# Patient Record
Sex: FEMALE | Race: WHITE | URBAN - METROPOLITAN AREA
[De-identification: names, ages, dates, MRNs, and addresses within clinical notes are randomized per-mention and may not be internally consistent; named-entity substitution may affect disease eponyms.]

---

## 2019-06-17 ENCOUNTER — HISTORICAL (OUTPATIENT)
Dept: ADMINISTRATIVE | Facility: HOSPITAL | Age: 24
End: 2019-06-17

## 2019-06-17 ENCOUNTER — HISTORICAL (OUTPATIENT)
Dept: LAB | Facility: HOSPITAL | Age: 24
End: 2019-06-17

## 2022-04-07 ENCOUNTER — HISTORICAL (OUTPATIENT)
Dept: ADMINISTRATIVE | Facility: HOSPITAL | Age: 27
End: 2022-04-07

## 2022-04-23 VITALS
DIASTOLIC BLOOD PRESSURE: 85 MMHG | BODY MASS INDEX: 28.22 KG/M2 | SYSTOLIC BLOOD PRESSURE: 128 MMHG | WEIGHT: 143.75 LBS | HEIGHT: 60 IN | OXYGEN SATURATION: 98 %

## 2025-03-27 ENCOUNTER — TELEPHONE (OUTPATIENT)
Dept: FAMILY MEDICINE | Facility: CLINIC | Age: 30
End: 2025-03-27
Payer: COMMERCIAL

## 2025-03-27 NOTE — TELEPHONE ENCOUNTER
Confirmed appointment with patient. She has not had a PCP in a very long time. No records to request.

## 2025-04-03 ENCOUNTER — OFFICE VISIT (OUTPATIENT)
Dept: FAMILY MEDICINE | Facility: CLINIC | Age: 30
End: 2025-04-03
Payer: COMMERCIAL

## 2025-04-03 VITALS
HEART RATE: 79 BPM | WEIGHT: 147 LBS | HEIGHT: 61 IN | RESPIRATION RATE: 16 BRPM | BODY MASS INDEX: 27.75 KG/M2 | DIASTOLIC BLOOD PRESSURE: 75 MMHG | OXYGEN SATURATION: 98 % | SYSTOLIC BLOOD PRESSURE: 109 MMHG

## 2025-04-03 DIAGNOSIS — Z76.89 ENCOUNTER TO ESTABLISH CARE: Primary | ICD-10-CM

## 2025-04-03 DIAGNOSIS — R22.1 LOCALIZED SWELLING, MASS AND LUMP, NECK: ICD-10-CM

## 2025-04-03 DIAGNOSIS — Z13.1 SCREENING FOR DIABETES MELLITUS: ICD-10-CM

## 2025-04-03 DIAGNOSIS — R01.1 HEART MURMUR: ICD-10-CM

## 2025-04-03 DIAGNOSIS — Z11.4 SCREENING FOR HIV (HUMAN IMMUNODEFICIENCY VIRUS): ICD-10-CM

## 2025-04-03 DIAGNOSIS — Z00.00 ANNUAL PHYSICAL EXAM: ICD-10-CM

## 2025-04-03 DIAGNOSIS — Z11.59 NEED FOR HEPATITIS C SCREENING TEST: ICD-10-CM

## 2025-04-03 DIAGNOSIS — Z80.8 FAMILY HISTORY OF MALIGNANT MELANOMA OF SKIN: ICD-10-CM

## 2025-04-03 RX ORDER — VITAMIN E 268 MG
400 CAPSULE ORAL DAILY
COMMUNITY

## 2025-04-03 RX ORDER — IBUPROFEN 100 MG/5ML
1000 SUSPENSION, ORAL (FINAL DOSE FORM) ORAL DAILY
COMMUNITY

## 2025-04-03 RX ORDER — TRETINOIN 0.5 MG/G
CREAM TOPICAL
COMMUNITY
Start: 2023-07-31

## 2025-04-03 RX ORDER — CRANBERRY FRUIT 450 MG
TABLET ORAL
COMMUNITY

## 2025-04-03 NOTE — PROGRESS NOTES
Subjective:       Patient ID: Rosario Davenport is a 29 y.o. female.    Chief Complaint: Establish Care (Needs a PCP, has been a while since she has seen a doctor.//R side neck/throat swollen x3/4 months. Had the FLU 1 week before [place] and that is when she noticed it. Swelling never went down. No pain now )      History of Present Illness    CHIEF COMPLAINT:  Patient presents today to establish care with concerns about neck swelling    HISTORY OF PRESENT ILLNESS:  She first noticed right sided neck swelling during a flu episode the week before [place], accompanied by severe sore throat. The swelling has persisted with fluctuating size and is non-tender to palpation. She experiences occasional random sore throats. During the initial flu episode, she had pain when looking straight ahead and swallowing, which improved with head tilting. She denies any history of neck swelling prior to this incident.    MEDICAL HISTORY:  She denies any significant medical problems. She receives annual dermatologic care from Dr. Clifford for acne management.    FAMILY HISTORY:  Father has thyroid issues managed with supplements. Mother has history of melanoma. Sister had HPV-positive squamous cell carcinoma of the posterior tongue in the head and neck region. Maternal grandfather had Hodgkin's lymphoma attributed to Agent Orange exposure.        Review of Systems  Comprehensive review of systems negative except as stated in HPI    The patient's Health Maintenance was reviewed and the following appears to be due:   Health Maintenance Due   Topic Date Due    Hepatitis C Screening  Never done    Lipid Panel  Never done    HIV Screening  Never done    Cervical Cancer Screening  04/29/2023       Past Medical History:  History reviewed. No pertinent past medical history.  History reviewed. No pertinent surgical history.  Review of patient's allergies indicates:  No Known Allergies  Medications Ordered Prior to Encounter[1]  Social  "History[2]  Family History   Problem Relation Name Age of Onset    Melanoma Mother Alicia Davenport         skin cancer    Cancer Sister Lisa Davenport         hpv squamous cell carcinoma    Hodgkin's lymphoma Maternal Grandfather Davis Bagley     Diabetes Paternal Grandmother Victoria Davenport        Objective:       /75 (BP Location: Left arm)   Pulse 79   Resp 16   Ht 5' 1" (1.549 m)   Wt 66.7 kg (147 lb)   LMP 03/16/2025   SpO2 98%   BMI 27.78 kg/m²      Physical Exam  Vitals and nursing note reviewed.   Constitutional:       Appearance: Normal appearance.   HENT:      Head: Normocephalic and atraumatic.      Right Ear: Tympanic membrane, ear canal and external ear normal.      Left Ear: Tympanic membrane, ear canal and external ear normal.      Nose: Nose normal.      Mouth/Throat:      Mouth: Mucous membranes are moist.      Pharynx: Oropharynx is clear.   Eyes:      Extraocular Movements: Extraocular movements intact.      Conjunctiva/sclera: Conjunctivae normal.      Pupils: Pupils are equal, round, and reactive to light.   Neck:      Thyroid: Thyromegaly present.        Comments: Right-sided thyromegaly  Cardiovascular:      Rate and Rhythm: Normal rate and regular rhythm.      Heart sounds: Murmur heard.      Systolic murmur is present with a grade of 1/6.   Pulmonary:      Effort: Pulmonary effort is normal.      Breath sounds: Normal breath sounds.   Musculoskeletal:         General: Normal range of motion.      Cervical back: Normal range of motion and neck supple.   Skin:     General: Skin is warm and dry.   Neurological:      General: No focal deficit present.      Mental Status: She is alert and oriented to person, place, and time.   Psychiatric:         Mood and Affect: Mood normal.         Behavior: Behavior normal.         Thought Content: Thought content normal.         Judgment: Judgment normal.             Assessment and Plan     Assessment & Plan    Z76.89 Encounter to establish " care  R22.1 Localized swelling, mass and lump, neck  Z00.00 Annual physical exam  Z11.59 Need for hepatitis C screening test  Z11.4 Screening for HIV (human immunodeficiency virus)  Z13.1 Screening for diabetes mellitus    IMPRESSION:  - Mild heart murmur, not currently concerning but to be monitored annually.    TO ESTABLISH CARE:  - Follow up during Easter break (April 14-21) to review US and lab results.    SWELLING, MASS AND LUMP, NECK:  - Ordered thyroid ultrasound to investigate neck swelling, with plan to proceed to CT Neck if US is unremarkable.  - Schedule US within the next week, preferably early morning and fasting.    PHYSICAL EXAM:  - Ordered wellness labs (fasting).  - Schedule labs within the next week, preferably early morning and fasting.  - Consider scheduling pap smear with Dr. Serrano or alternative OB/GYN if unable to get appointment.  - Family history of melanoma (mother) and squamous cell carcinoma of head and neck (sister), increasing risk for skin cancer.  - Patient to remind Dr. Clifford of family history of melanoma and request full body skin check.  - Recommend full body skin check with dermatologist due to family history of skin cancer.           1. Encounter to establish care    2. Localized swelling, mass and lump, neck  -     US Soft Tissue Head Neck; Future; Expected date: 04/03/2025    3. Annual physical exam  -     CBC Auto Differential; Future; Expected date: 04/03/2025  -     Comprehensive Metabolic Panel; Future; Expected date: 04/03/2025  -     Lipid Panel; Future; Expected date: 04/03/2025  -     TSH; Future; Expected date: 04/03/2025  -     Hemoglobin A1C; Future; Expected date: 04/03/2025    4. Need for hepatitis C screening test  -     Hepatitis C Antibody; Future; Expected date: 04/03/2025    5. Screening for HIV (human immunodeficiency virus)  -     HIV 1/2 Ag/Ab (4th Gen); Future; Expected date: 04/03/2025    6. Screening for diabetes mellitus  -     Hemoglobin A1C; Future;  Expected date: 2025    7. Family history of malignant melanoma of skin  Overview:  Mother  Dr Clifford    Assessment & Plan:  Only seeing Dr. Clifford for her acne, strongly encouraged to let her know that her mother has a history of malignant melanoma and is a patient of hers.  Encouraged to get full-body skin check.             Follow up in about 2 weeks (around 2025) for Annual.     This note was generated with the assistance of ambient listening technology. Verbal consent was obtained by the patient and accompanying visitor(s) for the recording of patient appointment to facilitate this note. I attest to having reviewed and edited the generated note for accuracy, though some syntax or spelling errors may persist. Please contact the author of this note for any clarification.            [1]   Current Outpatient Medications on File Prior to Visit   Medication Sig Dispense Refill    ascorbic acid, vitamin C, (VITAMIN C) 1000 MG tablet Take 1,000 mg by mouth once daily.      cranberry fruit (CRANBERRY) 450 mg Tab Take by mouth.      multivitamin with minerals tablet Take 1 tablet by mouth once daily.      tretinoin (RETIN-A) 0.05 % cream       UNABLE TO FIND medication name: TUMERIC      vitamin E 400 UNIT capsule Take 400 Units by mouth once daily.       No current facility-administered medications on file prior to visit.   [2]   Social History  Socioeconomic History    Marital status: Single   Tobacco Use    Smoking status: Former     Current packs/day: 0.00     Average packs/day: 0.3 packs/day for 2.0 years (0.5 ttl pk-yrs)     Types: Cigarettes     Start date: 2014     Quit date: 2016     Years since quittin.2    Smokeless tobacco: Never   Substance and Sexual Activity    Alcohol use: Not Currently    Drug use: Never    Sexual activity: Not Currently     Partners: Male     Birth control/protection: Abstinence     Social Drivers of Health     Financial Resource Strain: Low Risk  (3/31/2025)     Overall Financial Resource Strain (CARDIA)     Difficulty of Paying Living Expenses: Not very hard   Food Insecurity: No Food Insecurity (3/31/2025)    Hunger Vital Sign     Worried About Running Out of Food in the Last Year: Never true     Ran Out of Food in the Last Year: Never true   Transportation Needs: No Transportation Needs (3/31/2025)    PRAPARE - Transportation     Lack of Transportation (Medical): No     Lack of Transportation (Non-Medical): No   Physical Activity: Sufficiently Active (3/31/2025)    Exercise Vital Sign     Days of Exercise per Week: 3 days     Minutes of Exercise per Session: 60 min   Stress: No Stress Concern Present (3/31/2025)    [employer] - Occupational Stress Questionnaire     Feeling of Stress : Only a little   Housing Stability: Low Risk  (3/31/2025)    Housing Stability Vital Sign     Unable to Pay for Housing in the Last Year: No     Number of Times Moved in the Last Year: 0     Homeless in the Last Year: No

## 2025-04-03 NOTE — ASSESSMENT & PLAN NOTE
Only seeing Dr. Clifford for her acne, strongly encouraged to let her know that her mother has a history of malignant melanoma and is a patient of hers.  Encouraged to get full-body skin check.

## 2025-04-10 ENCOUNTER — PATIENT MESSAGE (OUTPATIENT)
Dept: FAMILY MEDICINE | Facility: CLINIC | Age: 30
End: 2025-04-10
Payer: COMMERCIAL

## 2025-04-14 ENCOUNTER — RESULTS FOLLOW-UP (OUTPATIENT)
Dept: FAMILY MEDICINE | Facility: CLINIC | Age: 30
End: 2025-04-14

## 2025-04-14 ENCOUNTER — LAB VISIT (OUTPATIENT)
Dept: LAB | Facility: HOSPITAL | Age: 30
End: 2025-04-14
Attending: NURSE PRACTITIONER
Payer: COMMERCIAL

## 2025-04-14 DIAGNOSIS — Z13.1 SCREENING FOR DIABETES MELLITUS: ICD-10-CM

## 2025-04-14 DIAGNOSIS — Z11.59 NEED FOR HEPATITIS C SCREENING TEST: ICD-10-CM

## 2025-04-14 DIAGNOSIS — Z11.4 SCREENING FOR HIV (HUMAN IMMUNODEFICIENCY VIRUS): ICD-10-CM

## 2025-04-14 DIAGNOSIS — Z00.00 ANNUAL PHYSICAL EXAM: ICD-10-CM

## 2025-04-14 LAB
ALBUMIN SERPL-MCNC: 4.1 G/DL (ref 3.5–5)
ALBUMIN/GLOB SERPL: 1.2 RATIO (ref 1.1–2)
ALP SERPL-CCNC: 65 UNIT/L (ref 40–150)
ALT SERPL-CCNC: 14 UNIT/L (ref 0–55)
ANION GAP SERPL CALC-SCNC: 9 MEQ/L
AST SERPL-CCNC: 20 UNIT/L (ref 11–45)
BASOPHILS # BLD AUTO: 0.03 X10(3)/MCL
BASOPHILS NFR BLD AUTO: 0.5 %
BILIRUB SERPL-MCNC: 0.5 MG/DL
BUN SERPL-MCNC: 10.5 MG/DL (ref 7–18.7)
CALCIUM SERPL-MCNC: 9.5 MG/DL (ref 8.4–10.2)
CHLORIDE SERPL-SCNC: 108 MMOL/L (ref 98–107)
CO2 SERPL-SCNC: 23 MMOL/L (ref 22–29)
CREAT SERPL-MCNC: 0.66 MG/DL (ref 0.55–1.02)
CREAT/UREA NIT SERPL: 16
EOSINOPHIL # BLD AUTO: 0.06 X10(3)/MCL (ref 0–0.9)
EOSINOPHIL NFR BLD AUTO: 1 %
ERYTHROCYTE [DISTWIDTH] IN BLOOD BY AUTOMATED COUNT: 12.9 % (ref 11.5–17)
EST. AVERAGE GLUCOSE BLD GHB EST-MCNC: 108.3 MG/DL
GFR SERPLBLD CREATININE-BSD FMLA CKD-EPI: >60 ML/MIN/1.73/M2
GLOBULIN SER-MCNC: 3.3 GM/DL (ref 2.4–3.5)
GLUCOSE SERPL-MCNC: 88 MG/DL (ref 74–100)
HBA1C MFR BLD: 5.4 %
HCT VFR BLD AUTO: 38 % (ref 37–47)
HGB BLD-MCNC: 12.5 G/DL (ref 12–16)
IMM GRANULOCYTES # BLD AUTO: 0.01 X10(3)/MCL (ref 0–0.04)
IMM GRANULOCYTES NFR BLD AUTO: 0.2 %
LYMPHOCYTES # BLD AUTO: 2.46 X10(3)/MCL (ref 0.6–4.6)
LYMPHOCYTES NFR BLD AUTO: 42.5 %
MCH RBC QN AUTO: 29 PG (ref 27–31)
MCHC RBC AUTO-ENTMCNC: 32.9 G/DL (ref 33–36)
MCV RBC AUTO: 88.2 FL (ref 80–94)
MONOCYTES # BLD AUTO: 0.41 X10(3)/MCL (ref 0.1–1.3)
MONOCYTES NFR BLD AUTO: 7.1 %
NEUTROPHILS # BLD AUTO: 2.82 X10(3)/MCL (ref 2.1–9.2)
NEUTROPHILS NFR BLD AUTO: 48.7 %
NRBC BLD AUTO-RTO: 0 %
PLATELET # BLD AUTO: 224 X10(3)/MCL (ref 130–400)
PMV BLD AUTO: 11.7 FL (ref 7.4–10.4)
POTASSIUM SERPL-SCNC: 4.1 MMOL/L (ref 3.5–5.1)
PROT SERPL-MCNC: 7.4 GM/DL (ref 6.4–8.3)
RBC # BLD AUTO: 4.31 X10(6)/MCL (ref 4.2–5.4)
SODIUM SERPL-SCNC: 140 MMOL/L (ref 136–145)
TSH SERPL-ACNC: 2.25 UIU/ML (ref 0.35–4.94)
WBC # BLD AUTO: 5.79 X10(3)/MCL (ref 4.5–11.5)

## 2025-04-14 PROCEDURE — 84443 ASSAY THYROID STIM HORMONE: CPT

## 2025-04-14 PROCEDURE — 85025 COMPLETE CBC W/AUTO DIFF WBC: CPT

## 2025-04-14 PROCEDURE — 87389 HIV-1 AG W/HIV-1&-2 AB AG IA: CPT

## 2025-04-14 PROCEDURE — 36415 COLL VENOUS BLD VENIPUNCTURE: CPT

## 2025-04-14 PROCEDURE — 80053 COMPREHEN METABOLIC PANEL: CPT

## 2025-04-14 PROCEDURE — 86803 HEPATITIS C AB TEST: CPT

## 2025-04-14 PROCEDURE — 83036 HEMOGLOBIN GLYCOSYLATED A1C: CPT

## 2025-04-14 NOTE — PROGRESS NOTES
Thyroid ultrasound reviewed, thyroid nodule noted, will discuss in detail at her upcoming appointment.  No suspicious masses noted

## 2025-04-15 LAB
HCV AB SERPL QL IA: NONREACTIVE
HIV 1+2 AB+HIV1 P24 AG SERPL QL IA: NONREACTIVE

## 2025-04-17 ENCOUNTER — OFFICE VISIT (OUTPATIENT)
Dept: FAMILY MEDICINE | Facility: CLINIC | Age: 30
End: 2025-04-17
Payer: COMMERCIAL

## 2025-04-17 VITALS
RESPIRATION RATE: 16 BRPM | BODY MASS INDEX: 27.94 KG/M2 | HEART RATE: 69 BPM | WEIGHT: 148 LBS | OXYGEN SATURATION: 98 % | DIASTOLIC BLOOD PRESSURE: 62 MMHG | SYSTOLIC BLOOD PRESSURE: 106 MMHG | HEIGHT: 61 IN

## 2025-04-17 DIAGNOSIS — Z00.00 ANNUAL PHYSICAL EXAM: Primary | ICD-10-CM

## 2025-04-17 DIAGNOSIS — E04.1 THYROID NODULE: ICD-10-CM

## 2025-04-17 PROBLEM — Z80.9 FAMILY HISTORY OF SQUAMOUS CELL CARCINOMA: Status: ACTIVE | Noted: 2025-04-17

## 2025-04-17 PROCEDURE — 3008F BODY MASS INDEX DOCD: CPT | Mod: CPTII,,, | Performed by: NURSE PRACTITIONER

## 2025-04-17 PROCEDURE — 99395 PREV VISIT EST AGE 18-39: CPT | Mod: ,,, | Performed by: NURSE PRACTITIONER

## 2025-04-17 PROCEDURE — 1160F RVW MEDS BY RX/DR IN RCRD: CPT | Mod: CPTII,,, | Performed by: NURSE PRACTITIONER

## 2025-04-17 PROCEDURE — 3078F DIAST BP <80 MM HG: CPT | Mod: CPTII,,, | Performed by: NURSE PRACTITIONER

## 2025-04-17 PROCEDURE — 3044F HG A1C LEVEL LT 7.0%: CPT | Mod: CPTII,,, | Performed by: NURSE PRACTITIONER

## 2025-04-17 PROCEDURE — 3074F SYST BP LT 130 MM HG: CPT | Mod: CPTII,,, | Performed by: NURSE PRACTITIONER

## 2025-04-17 PROCEDURE — 1159F MED LIST DOCD IN RCRD: CPT | Mod: CPTII,,, | Performed by: NURSE PRACTITIONER

## 2025-04-17 NOTE — ASSESSMENT & PLAN NOTE
- Explained thyroid nodule, while large and palpable, has reassuring characteristics on ultrasound.  - Discussed biopsy is safest option for definitive diagnosis of palpable thyroid nodules.  - Clarified sister's cancer (squamous cell carcinoma of the oropharynx) is different from potential thyroid nodule concerns.  - Referred to Dr. Adame for thyroid nodule biopsy.  - Await call from Dr. Adame's office to schedule thyroid biopsy appointment.

## 2025-04-17 NOTE — ASSESSMENT & PLAN NOTE
- Patient to fast before next blood draw for lipid panel, except for coffee which is allowed.  - Ordered fasting lipid panel.  - Follow up on Friday morning at 8:30 AM for fasting lipid panel.  - Follow up in 1 year for annual appointment.

## 2025-04-17 NOTE — PROGRESS NOTES
"Subjective:       Patient ID: Rosario Davenport is a 29 y.o. female.    Chief Complaint: Annual Exam      History of Present Illness    CHIEF COMPLAINT:  Patient presents today for wellness visit and to review lab results and ultrasound    FAMILY HISTORY:  She reports maternal grandfather and maternal great-grandmother had heart murmurs. Her sister had squamous cell carcinoma of the oropharynx.  Her mom had skin cancer.    LABS:  TSH was 2.255. Chemistries were normal. Hemoglobin A1C was 5.4, indicating no diabetes. Blood counts were normal. Hepatitis C and HIV tests were negative.     IMAGING:  Thyroid ultrasound showed large simple colloid cyst of the right lobe       Review of Systems  Comprehensive review of systems negative except as stated in HPI    The patient's Health Maintenance was reviewed and the following appears to be due:   Health Maintenance Due   Topic Date Due    Lipid Panel  Never done    Cervical Cancer Screening  05/16/2025       Past Medical History:  History reviewed. No pertinent past medical history.  History reviewed. No pertinent surgical history.  Review of patient's allergies indicates:  No Known Allergies  Medications Ordered Prior to Encounter[1]  Social History[2]  Family History   Problem Relation Name Age of Onset    Melanoma Mother Alicia Davenport         skin cancer    Cancer Sister Lisa Davenport         hpv squamous cell carcinoma    Hodgkin's lymphoma Maternal Grandfather Davis Bagley     Heart murmur Maternal Grandfather Davis Bagley     Diabetes Paternal Grandmother Victoria Davenport     Heart murmur Maternal Great-Grandmother         Objective:       /62 (BP Location: Left arm)   Pulse 69   Resp 16   Ht 5' 1" (1.549 m)   Wt 67.1 kg (148 lb)   LMP 03/16/2025   SpO2 98%   BMI 27.96 kg/m²      Physical Exam  Vitals and nursing note reviewed.   Constitutional:       Appearance: Normal appearance.   HENT:      Head: Normocephalic and atraumatic.      Right Ear: " Tympanic membrane, ear canal and external ear normal.      Left Ear: Tympanic membrane, ear canal and external ear normal.      Nose: Nose normal.      Mouth/Throat:      Mouth: Mucous membranes are moist.      Pharynx: Oropharynx is clear.   Eyes:      Extraocular Movements: Extraocular movements intact.      Conjunctiva/sclera: Conjunctivae normal.      Pupils: Pupils are equal, round, and reactive to light.   Neck:      Thyroid: Thyromegaly present.        Comments: Right-sided thyromegaly.  Palpable right thyroid nodule.  Cardiovascular:      Rate and Rhythm: Normal rate and regular rhythm.      Heart sounds: Murmur heard.      Systolic murmur is present with a grade of 1/6.   Pulmonary:      Effort: Pulmonary effort is normal.      Breath sounds: Normal breath sounds.   Musculoskeletal:         General: Normal range of motion.      Cervical back: Normal range of motion and neck supple.   Skin:     General: Skin is warm and dry.   Neurological:      General: No focal deficit present.      Mental Status: She is alert and oriented to person, place, and time.   Psychiatric:         Mood and Affect: Mood normal.         Behavior: Behavior normal.         Thought Content: Thought content normal.         Judgment: Judgment normal.         Labs  Lab Visit on 04/14/2025   Component Date Value Ref Range Status    Sodium 04/14/2025 140  136 - 145 mmol/L Final    Potassium 04/14/2025 4.1  3.5 - 5.1 mmol/L Final    Chloride 04/14/2025 108 (H)  98 - 107 mmol/L Final    CO2 04/14/2025 23  22 - 29 mmol/L Final    Glucose 04/14/2025 88  74 - 100 mg/dL Final    Blood Urea Nitrogen 04/14/2025 10.5  7.0 - 18.7 mg/dL Final    Creatinine 04/14/2025 0.66  0.55 - 1.02 mg/dL Final    Calcium 04/14/2025 9.5  8.4 - 10.2 mg/dL Final    Protein Total 04/14/2025 7.4  6.4 - 8.3 gm/dL Final    Albumin 04/14/2025 4.1  3.5 - 5.0 g/dL Final    Globulin 04/14/2025 3.3  2.4 - 3.5 gm/dL Final    Albumin/Globulin Ratio 04/14/2025 1.2  1.1 - 2.0  ratio Final    Bilirubin Total 04/14/2025 0.5  <=1.5 mg/dL Final    ALP 04/14/2025 65  40 - 150 unit/L Final    ALT 04/14/2025 14  0 - 55 unit/L Final    AST 04/14/2025 20  11 - 45 unit/L Final    eGFR 04/14/2025 >60  mL/min/1.73/m2 Final    Estimated GFR calculated using the CKD-EPI creatinine (2021) equation.    Anion Gap 04/14/2025 9.0  mEq/L Final    BUN/Creatinine Ratio 04/14/2025 16   Final    TSH 04/14/2025 2.255  0.350 - 4.940 uIU/mL Final    Hemoglobin A1c 04/14/2025 5.4  <=7.0 % Final    Estimated Average Glucose 04/14/2025 108.3  mg/dL Final    Hep C Ab Interp 04/14/2025 Nonreactive  Nonreactive Final    HIV 04/14/2025 Nonreactive  Nonreactive Final    WBC 04/14/2025 5.79  4.50 - 11.50 x10(3)/mcL Final    RBC 04/14/2025 4.31  4.20 - 5.40 x10(6)/mcL Final    Hgb 04/14/2025 12.5  12.0 - 16.0 g/dL Final    Hct 04/14/2025 38.0  37.0 - 47.0 % Final    MCV 04/14/2025 88.2  80.0 - 94.0 fL Final    MCH 04/14/2025 29.0  27.0 - 31.0 pg Final    MCHC 04/14/2025 32.9 (L)  33.0 - 36.0 g/dL Final    RDW 04/14/2025 12.9  11.5 - 17.0 % Final    Platelet 04/14/2025 224  130 - 400 x10(3)/mcL Final    MPV 04/14/2025 11.7 (H)  7.4 - 10.4 fL Final    Neut % 04/14/2025 48.7  % Final    Lymph % 04/14/2025 42.5  % Final    Mono % 04/14/2025 7.1  % Final    Eos % 04/14/2025 1.0  % Final    Basophil % 04/14/2025 0.5  % Final    Imm Grans % 04/14/2025 0.2  % Final    Neut # 04/14/2025 2.82  2.1 - 9.2 x10(3)/mcL Final    Lymph # 04/14/2025 2.46  0.6 - 4.6 x10(3)/mcL Final    Mono # 04/14/2025 0.41  0.1 - 1.3 x10(3)/mcL Final    Eos # 04/14/2025 0.06  0 - 0.9 x10(3)/mcL Final    Baso # 04/14/2025 0.03  <=0.2 x10(3)/mcL Final    Imm Gran # 04/14/2025 0.01  0.00 - 0.04 x10(3)/mcL Final    NRBC% 04/14/2025 0.0  % Final       Assessment and Plan     Assessment & Plan    Z00.00 Annual physical exam  E04.1 Thyroid nodule    IMPRESSION:  - Thyroid US revealed large simple colloid nodule with reassuring characteristics.  - Biopsy recommended  due to palpable nature of nodule, despite reassuring US findings.  - Lab results overall normal, including negative hepatitis C and HIV tests, normal TSH (2.255), normal chemistries, and normal HbA1c (5.4%).  - Mild heart murmur noted, likely benign given age and lack of symptoms, and has probably been present for a long time without causing issues.    PHYSICAL EXAM:  - Patient to fast before next blood draw for lipid panel, except for coffee which is allowed.  - Ordered fasting lipid panel.  - Follow up on Friday morning at 8:30 AM for fasting lipid panel.  - Follow up in 1 year for annual appointment.    NODULE:  - Explained thyroid nodule, while large and palpable, has reassuring characteristics on ultrasound.  - Discussed biopsy is safest option for definitive diagnosis of palpable thyroid nodules.  - Clarified sister's cancer (squamous cell carcinoma of the oropharynx) is different from potential thyroid nodule concerns.  - Referred to Dr. Adame for thyroid nodule biopsy.  - Await call from Dr. Adame's office to schedule thyroid biopsy appointment.           1. Annual physical exam  Overview:  Annual wellness exam yearly in April    Assessment & Plan:  - Patient to fast before next blood draw for lipid panel, except for coffee which is allowed.  - Ordered fasting lipid panel.  - Follow up on Friday morning at 8:30 AM for fasting lipid panel.  - Follow up in 1 year for annual appointment.      2. Thyroid nodule  Overview:  US thyroid 04/14/2025 - Mild expansion of the right thyroid lobe due to a large simple colloid cyst at the right mid to lower thyroid measuring 2 cm in greatest dimension. Single small benign-appearing solid nodule at the left lower lobe measuring 4 mm in greatest dimension.    04/17/2025 - refer to Dr Adame    Assessment & Plan:  - Explained thyroid nodule, while large and palpable, has reassuring characteristics on ultrasound.  - Discussed biopsy is safest option for definitive diagnosis  of palpable thyroid nodules.  - Clarified sister's cancer (squamous cell carcinoma of the oropharynx) is different from potential thyroid nodule concerns.  - Referred to Dr. Adame for thyroid nodule biopsy.  - Await call from Dr. Adame's office to schedule thyroid biopsy appointment.     Orders:  -     Ambulatory referral/consult to ENT; Future; Expected date: 2025           Follow up in about 1 year (around 2026) for Annual.     This note was generated with the assistance of ambient listening technology. Verbal consent was obtained by the patient and accompanying visitor(s) for the recording of patient appointment to facilitate this note. I attest to having reviewed and edited the generated note for accuracy, though some syntax or spelling errors may persist. Please contact the author of this note for any clarification.            [1]   Current Outpatient Medications on File Prior to Visit   Medication Sig Dispense Refill    ascorbic acid, vitamin C, (VITAMIN C) 1000 MG tablet Take 1,000 mg by mouth once daily.      cranberry fruit (CRANBERRY) 450 mg Tab Take by mouth.      multivitamin with minerals tablet Take 1 tablet by mouth once daily.      tretinoin (RETIN-A) 0.05 % cream       UNABLE TO FIND medication name: TUMERIC      vitamin E 400 UNIT capsule Take 400 Units by mouth once daily.       No current facility-administered medications on file prior to visit.   [2]   Social History  Socioeconomic History    Marital status: Single   Tobacco Use    Smoking status: Former     Current packs/day: 0.00     Average packs/day: 0.3 packs/day for 2.0 years (0.5 ttl pk-yrs)     Types: Cigarettes     Start date: 2014     Quit date: 2016     Years since quittin.2    Smokeless tobacco: Never   Substance and Sexual Activity    Alcohol use: Not Currently    Drug use: Never    Sexual activity: Not Currently     Partners: Male     Birth control/protection: Abstinence     Social Drivers of Health      Financial Resource Strain: Low Risk  (3/31/2025)    Overall Financial Resource Strain (CARDIA)     Difficulty of Paying Living Expenses: Not very hard   Food Insecurity: No Food Insecurity (3/31/2025)    Hunger Vital Sign     Worried About Running Out of Food in the Last Year: Never true     Ran Out of Food in the Last Year: Never true   Transportation Needs: No Transportation Needs (3/31/2025)    PRAPARE - Transportation     Lack of Transportation (Medical): No     Lack of Transportation (Non-Medical): No   Physical Activity: Sufficiently Active (3/31/2025)    Exercise Vital Sign     Days of Exercise per Week: 3 days     Minutes of Exercise per Session: 60 min   Stress: No Stress Concern Present (3/31/2025)    [employer] - Occupational Stress Questionnaire     Feeling of Stress : Only a little   Housing Stability: Low Risk  (3/31/2025)    Housing Stability Vital Sign     Unable to Pay for Housing in the Last Year: No     Number of Times Moved in the Last Year: 0     Homeless in the Last Year: No

## 2025-05-19 ENCOUNTER — DOCUMENTATION ONLY (OUTPATIENT)
Facility: CLINIC | Age: 30
End: 2025-05-19
Payer: COMMERCIAL

## 2025-07-31 ENCOUNTER — PATIENT MESSAGE (OUTPATIENT)
Facility: CLINIC | Age: 30
End: 2025-07-31
Payer: COMMERCIAL